# Patient Record
Sex: MALE | URBAN - METROPOLITAN AREA
[De-identification: names, ages, dates, MRNs, and addresses within clinical notes are randomized per-mention and may not be internally consistent; named-entity substitution may affect disease eponyms.]

---

## 2023-09-29 ENCOUNTER — OFFICE VISIT (OUTPATIENT)
Dept: URBAN - METROPOLITAN AREA CLINIC 23 | Facility: CLINIC | Age: 33
End: 2023-09-29

## 2023-09-29 ENCOUNTER — LAB OUTSIDE AN ENCOUNTER (OUTPATIENT)
Dept: URBAN - METROPOLITAN AREA CLINIC 23 | Facility: CLINIC | Age: 33
End: 2023-09-29

## 2023-09-29 VITALS
WEIGHT: 145 LBS | HEIGHT: 72 IN | TEMPERATURE: 97.3 F | DIASTOLIC BLOOD PRESSURE: 87 MMHG | BODY MASS INDEX: 19.64 KG/M2 | HEART RATE: 79 BPM | SYSTOLIC BLOOD PRESSURE: 130 MMHG

## 2023-09-29 DIAGNOSIS — R63.4 WEIGHT LOSS: ICD-10-CM

## 2023-09-29 DIAGNOSIS — R11.2 NAUSEA & VOMITING: ICD-10-CM

## 2023-09-29 DIAGNOSIS — R68.81 EARLY SATIETY: ICD-10-CM

## 2023-09-29 DIAGNOSIS — R10.13 EPIGASTRIC ABDOMINAL PAIN: ICD-10-CM

## 2023-09-29 PROCEDURE — 99204 OFFICE O/P NEW MOD 45 MIN: CPT | Performed by: INTERNAL MEDICINE

## 2023-09-29 RX ORDER — PROMETHAZINE HYDROCHLORIDE 25 MG/1
1 TABLET AS NEEDED TABLET ORAL
Qty: 360 TABLET | Refills: 2 | OUTPATIENT
Start: 2023-09-29 | End: 2024-06-25

## 2023-09-29 RX ORDER — SUCRALFATE 1 G
1 TABLET ON AN EMPTY STOMACH TABLET ORAL TWICE A DAY
Qty: 28 TABLET | Refills: 0 | OUTPATIENT
Start: 2023-09-29 | End: 2023-10-13

## 2023-09-29 NOTE — HPI-TODAY'S VISIT:
33 year old male presents with a chief complaint of persistent nausea for the past three months. He reports that the nausea is particularly severe in the mornings and is only manageable with the use of promethazine. He has been unable to eat large portions of food and has had to resort to consuming small portions of easily digestible foods such as chicken, rice, and oatmeal. The patient has lost approximately five pounds due to these dietary changes. he  denies experiencing diarrhea and states that his symptoms are primarily localized to his stomach. He has tried Protonix and sucralfate as prescribed by his primary care doctor, with only minimal relief from sucralfate. The patient denies smoking marijuana or using any over-the-counter medications for his symptoms. He reports a history of alcohol consumption, approximately 24 drinks per week, but has stopped due to worsening symptoms upon alcohol intake.  The patient denies any family history of stomach cancer or other cancers. He finds relief from his symptoms by eating small portions of easily digestible foods, but cannot eat too much without exacerbating his symptoms. Jakob also reports frequent burping and the sensation of excessive air in his stomach, which can make him feel like he is going to vomit.  Additionally, the patient mentions the presence of skin rashes, which may be indicative of a food allergy. He has noticed random patches on his skin, potentially related to his gastrointestinal symptoms.

## 2023-10-03 ENCOUNTER — OFFICE VISIT (OUTPATIENT)
Dept: URBAN - METROPOLITAN AREA CLINIC 23 | Facility: CLINIC | Age: 33
End: 2023-10-03

## 2023-10-16 ENCOUNTER — TELEPHONE ENCOUNTER (OUTPATIENT)
Dept: URBAN - METROPOLITAN AREA CLINIC 23 | Facility: CLINIC | Age: 33
End: 2023-10-16

## 2023-10-16 ENCOUNTER — OUT OF OFFICE VISIT (OUTPATIENT)
Dept: URBAN - METROPOLITAN AREA SURGERY CENTER 15 | Facility: SURGERY CENTER | Age: 33
End: 2023-10-16

## 2023-10-16 ENCOUNTER — CLAIMS CREATED FROM THE CLAIM WINDOW (OUTPATIENT)
Dept: URBAN - METROPOLITAN AREA CLINIC 4 | Facility: CLINIC | Age: 33
End: 2023-10-16

## 2023-10-16 ENCOUNTER — LAB OUTSIDE AN ENCOUNTER (OUTPATIENT)
Dept: URBAN - METROPOLITAN AREA CLINIC 23 | Facility: CLINIC | Age: 33
End: 2023-10-16

## 2023-10-16 DIAGNOSIS — K31.89 ACHYLIA: ICD-10-CM

## 2023-10-16 DIAGNOSIS — K31.7 GASTRIC POLYPS: ICD-10-CM

## 2023-10-16 DIAGNOSIS — K44.9 HIATAL HERNIA: ICD-10-CM

## 2023-10-16 DIAGNOSIS — R11.10 ACUTE VOMITING: ICD-10-CM

## 2023-10-16 DIAGNOSIS — K31.89 OTHER DISEASES OF STOMACH AND DUODENUM: ICD-10-CM

## 2023-10-16 DIAGNOSIS — K31.7 BENIGN GASTRIC POLYP: ICD-10-CM

## 2023-10-16 DIAGNOSIS — T47.8X5A ADVERSE EFFECT OF OTHER AGENTS PRIMARILY AFFECTING GASTROINTESTINAL SYSTEM, INITIAL ENCOUNTER: ICD-10-CM

## 2023-10-16 DIAGNOSIS — R11.15 PERSISTENT VOMITING: ICD-10-CM

## 2023-10-16 PROCEDURE — 88312 SPECIAL STAINS GROUP 1: CPT | Performed by: PATHOLOGY

## 2023-10-16 PROCEDURE — G8907 PT DOC NO EVENTS ON DISCHARG: HCPCS | Performed by: INTERNAL MEDICINE

## 2023-10-16 PROCEDURE — 88305 TISSUE EXAM BY PATHOLOGIST: CPT | Performed by: PATHOLOGY

## 2023-10-16 PROCEDURE — 43239 EGD BIOPSY SINGLE/MULTIPLE: CPT | Performed by: INTERNAL MEDICINE

## 2023-10-16 PROCEDURE — 00731 ANES UPR GI NDSC PX NOS: CPT | Performed by: NURSE ANESTHETIST, CERTIFIED REGISTERED

## 2023-10-16 RX ORDER — PROMETHAZINE HYDROCHLORIDE 25 MG/1
1 TABLET AS NEEDED TABLET ORAL
Qty: 360 TABLET | Refills: 2 | Status: ACTIVE | COMMUNITY
Start: 2023-09-29 | End: 2024-06-25

## 2023-10-17 ENCOUNTER — WEB ENCOUNTER (OUTPATIENT)
Dept: URBAN - METROPOLITAN AREA CLINIC 23 | Facility: CLINIC | Age: 33
End: 2023-10-17

## 2023-10-17 RX ORDER — HYOSCYAMINE SULFATE 0.12 MG/1
1 TABLET AS NEEDED TABLET ORAL
Qty: 60 | Refills: 3 | OUTPATIENT
Start: 2023-10-17 | End: 2024-02-13

## 2023-11-17 ENCOUNTER — LAB OUTSIDE AN ENCOUNTER (OUTPATIENT)
Dept: URBAN - METROPOLITAN AREA CLINIC 23 | Facility: CLINIC | Age: 33
End: 2023-11-17

## 2023-11-20 ENCOUNTER — WEB ENCOUNTER (OUTPATIENT)
Dept: URBAN - METROPOLITAN AREA CLINIC 23 | Facility: CLINIC | Age: 33
End: 2023-11-20

## 2024-02-09 ENCOUNTER — OV EP (OUTPATIENT)
Dept: URBAN - METROPOLITAN AREA CLINIC 23 | Facility: CLINIC | Age: 34
End: 2024-02-09